# Patient Record
Sex: FEMALE | Race: WHITE | NOT HISPANIC OR LATINO | URBAN - METROPOLITAN AREA
[De-identification: names, ages, dates, MRNs, and addresses within clinical notes are randomized per-mention and may not be internally consistent; named-entity substitution may affect disease eponyms.]

---

## 2019-05-19 ENCOUNTER — EMERGENCY (EMERGENCY)
Facility: HOSPITAL | Age: 69
LOS: 1 days | Discharge: HOME | End: 2019-05-19
Attending: EMERGENCY MEDICINE
Payer: MEDICARE

## 2019-05-19 VITALS
SYSTOLIC BLOOD PRESSURE: 104 MMHG | RESPIRATION RATE: 17 BRPM | DIASTOLIC BLOOD PRESSURE: 68 MMHG | HEART RATE: 69 BPM | OXYGEN SATURATION: 95 %

## 2019-05-19 VITALS
SYSTOLIC BLOOD PRESSURE: 128 MMHG | DIASTOLIC BLOOD PRESSURE: 68 MMHG | RESPIRATION RATE: 18 BRPM | WEIGHT: 175.05 LBS | HEIGHT: 64 IN | HEART RATE: 92 BPM | OXYGEN SATURATION: 96 %

## 2019-05-19 PROCEDURE — 82962 GLUCOSE BLOOD TEST: CPT

## 2019-05-19 PROCEDURE — 96374 THER/PROPH/DIAG INJ IV PUSH: CPT

## 2019-05-19 PROCEDURE — 99284 EMERGENCY DEPT VISIT MOD MDM: CPT | Mod: GC

## 2019-05-19 PROCEDURE — 99285 EMERGENCY DEPT VISIT HI MDM: CPT | Mod: 25

## 2019-05-19 RX ORDER — ONDANSETRON 8 MG/1
4 TABLET, FILM COATED ORAL ONCE
Refills: 0 | Status: COMPLETED | OUTPATIENT
Start: 2019-05-19 | End: 2019-05-19

## 2019-05-19 RX ORDER — SODIUM CHLORIDE 9 MG/ML
1000 INJECTION INTRAMUSCULAR; INTRAVENOUS; SUBCUTANEOUS ONCE
Refills: 0 | Status: COMPLETED | OUTPATIENT
Start: 2019-05-19 | End: 2019-05-19

## 2019-05-19 RX ADMIN — SODIUM CHLORIDE 1000 MILLILITER(S): 9 INJECTION INTRAMUSCULAR; INTRAVENOUS; SUBCUTANEOUS at 18:40

## 2019-05-19 RX ADMIN — ONDANSETRON 4 MILLIGRAM(S): 8 TABLET, FILM COATED ORAL at 18:40

## 2019-05-19 NOTE — ED ADULT NURSE REASSESSMENT NOTE - NS ED NURSE REASSESS COMMENT FT1
Report received from CHASITY Orantes. pt in no acute distress, VSS, unable to obtain temp due to pts current intoxicated states

## 2019-05-19 NOTE — ED ADULT NURSE NOTE - CHPI ED NUR SYMPTOMS NEG
no fever/no hematuria/no chills/no blood in stool/no burning urination/no dysuria/no diarrhea/no abdominal distension

## 2019-05-19 NOTE — ED PROVIDER NOTE - OBJECTIVE STATEMENT
69yo F no pmh presenting with n/v after etoh use. Was at bridal shower, drank at least 3 mixed drinks, last drink 4pm. Since has felt n/v/general fatigue. Is taking an antibiotic for sore throat, unknown which. Denies cp, sob, headache, focal weakness, numbness, tingling, vision changes.

## 2019-05-19 NOTE — ED PROVIDER NOTE - CARE PLAN
Principal Discharge DX:	Nausea Principal Discharge DX:	Nausea  Secondary Diagnosis:	Alcoholic intoxication without complication

## 2019-05-19 NOTE — ED ADULT NURSE NOTE - NSIMPLEMENTINTERV_GEN_ALL_ED
Implemented All Fall Risk Interventions:  [place] to call system. Call bell, personal items and telephone within reach. Instruct patient to call for assistance. Room bathroom lighting operational. Non-slip footwear when patient is off stretcher. Physically safe environment: no spills, clutter or unnecessary equipment. Stretcher in lowest position, wheels locked, appropriate side rails in place. Provide visual cue, wrist band, yellow gown, etc. Monitor gait and stability. Monitor for mental status changes and reorient to person, place, and time. Review medications for side effects contributing to fall risk. Reinforce activity limits and safety measures with patient and family.

## 2019-05-19 NOTE — ED PROVIDER NOTE - CLINICAL SUMMARY MEDICAL DECISION MAKING FREE TEXT BOX
69yo F no pmh presenting with n/v after etoh use. No focal neuro findings. Meds, r/a. 67yo F no pmh presenting with n/v after etoh use. No focal neuro findings. Meds, r/a.    RUBA Mayorga MD: Pt is a 69 y/o female no sig PMH who presenting with n/v after alcohol use. Pt was at her daughter's bridal shower, consumed at least 3 mixed alcoholic beverages, last drink 4pm. Since has felt n/v/general fatigue. Is taking an antibiotic for sore throat, unknown which. Admits to not eating much today Denies cp, sob, headache, focal weakness, numbness, tingling, vision changes, trauma, HA, abd pain, CP, diarrhea, F/C. Clinically, pt appears intoxicated, primary survey intact, no concern for trauma given hx and physical exam. Pt is with her partner who believes that pt likely had too much to drink, likely needs to "sleep it off." Will check BG, give IVF, antiemetic, observe and re-assess for improvement prior to discharge

## 2019-05-19 NOTE — ED ADULT NURSE NOTE - OBJECTIVE STATEMENT
69 y/o female hx presents to the ED via EMS from party c/o vomiting s/p 3 glasses of "aeprol spritz." Pt states she did not eat, began to vomiting multiple episodes, "doesn't drink much ever." Denies fever, chills, weakness, abd pain, diarrhea/constipation, numbness/tingling, urinary s/s. Pt A&Ox3, in no respiratory distress, no CP, abd soft, nontender, nondistended. PT safety maintained with family at bedside, call bell within reach and bed in the lowest position.

## 2019-05-19 NOTE — ED PROVIDER NOTE - PHYSICAL EXAMINATION
Gen: No acute distress, alert, cooperative  HENT: Normocephalic, atraumatic.   Eyes: PERRL, EOMI    Resp: CTAB, non-labored, speaking without difficulty on room air, no wheeze  CV: rrr, no murmur  Abd: soft, NTND, no rebound or guarding  MSK: No CVAT bilaterally, no midline ttp  Skin: warm and dry  Neuro: AOx3, speech slurred, no focal deficits, normal strength and sensation all extremities  Psych: Normal affect
